# Patient Record
Sex: MALE | Race: BLACK OR AFRICAN AMERICAN | Employment: FULL TIME | URBAN - METROPOLITAN AREA
[De-identification: names, ages, dates, MRNs, and addresses within clinical notes are randomized per-mention and may not be internally consistent; named-entity substitution may affect disease eponyms.]

---

## 2017-11-08 PROBLEM — F32.1 MODERATE SINGLE CURRENT EPISODE OF MAJOR DEPRESSIVE DISORDER (HCC): Status: ACTIVE | Noted: 2017-11-08

## 2017-11-08 PROBLEM — F41.1 GENERALIZED ANXIETY DISORDER: Status: ACTIVE | Noted: 2017-11-08

## 2017-11-08 PROBLEM — J30.89 CHRONIC NON-SEASONAL ALLERGIC RHINITIS: Status: ACTIVE | Noted: 2017-11-08

## 2017-12-20 PROBLEM — I10 ESSENTIAL HYPERTENSION: Status: ACTIVE | Noted: 2017-12-20

## 2019-12-09 ENCOUNTER — HOSPITAL ENCOUNTER (OUTPATIENT)
Dept: ULTRASOUND IMAGING | Age: 49
Discharge: HOME | End: 2019-12-09
Attending: NURSE PRACTITIONER

## 2019-12-09 DIAGNOSIS — N50.89 SCROTAL SWELLING: ICD-10-CM

## 2019-12-09 DIAGNOSIS — R10.9 ACUTE LEFT FLANK PAIN: ICD-10-CM

## 2021-10-29 ENCOUNTER — HOSPITAL ENCOUNTER (OUTPATIENT)
Dept: CT IMAGING | Age: 51
Discharge: HOME | End: 2021-10-29
Attending: NURSE PRACTITIONER

## 2021-10-29 DIAGNOSIS — R10.32 LLQ PAIN: ICD-10-CM

## 2021-10-29 DIAGNOSIS — R10.9 LEFT FLANK PAIN: ICD-10-CM

## 2021-10-29 DIAGNOSIS — R10.12 LUQ PAIN: ICD-10-CM

## 2021-11-16 LAB — COLONOSCOPY, EXTERNAL: NORMAL

## 2022-03-19 PROBLEM — I10 ESSENTIAL HYPERTENSION: Status: ACTIVE | Noted: 2017-12-20

## 2022-03-19 PROBLEM — F32.1 MODERATE SINGLE CURRENT EPISODE OF MAJOR DEPRESSIVE DISORDER (HCC): Status: ACTIVE | Noted: 2017-11-08

## 2022-03-19 PROBLEM — J30.89 CHRONIC NON-SEASONAL ALLERGIC RHINITIS: Status: ACTIVE | Noted: 2017-11-08

## 2022-03-20 PROBLEM — F41.1 GENERALIZED ANXIETY DISORDER: Status: ACTIVE | Noted: 2017-11-08

## 2022-08-04 ENCOUNTER — TELEPHONE (OUTPATIENT)
Dept: FAMILY MEDICINE CLINIC | Facility: CLINIC | Age: 52
End: 2022-08-04

## 2022-08-04 NOTE — TELEPHONE ENCOUNTER
Pt was seen 03/23/2022 - 03/25/2022. His workplace lost his excuse and he needs a copy of it. I can't find it in the new system. Call pt and let him  know if he can get a copy or if a new one can be issued. He said he would like to get it tomorrow if possible.

## 2022-09-08 ENCOUNTER — OFFICE VISIT (OUTPATIENT)
Dept: FAMILY MEDICINE CLINIC | Facility: CLINIC | Age: 52
End: 2022-09-08
Payer: COMMERCIAL

## 2022-09-08 VITALS — TEMPERATURE: 97.5 F | WEIGHT: 218.8 LBS | OXYGEN SATURATION: 98 % | BODY MASS INDEX: 28.87 KG/M2 | HEART RATE: 55 BPM

## 2022-09-08 DIAGNOSIS — I10 ESSENTIAL (PRIMARY) HYPERTENSION: ICD-10-CM

## 2022-09-08 DIAGNOSIS — M25.512 ACUTE PAIN OF LEFT SHOULDER: Primary | ICD-10-CM

## 2022-09-08 DIAGNOSIS — R73.09 ELEVATED GLUCOSE: ICD-10-CM

## 2022-09-08 LAB
ALBUMIN, URINE, POC: 30 MG/L
BILIRUBIN, URINE, POC: NEGATIVE
BLOOD URINE, POC: ABNORMAL
CREATININE, URINE, POC: 300 MG/DL
GLUCOSE URINE, POC: NEGATIVE
KETONES, URINE, POC: NEGATIVE
LEUKOCYTE ESTERASE, URINE, POC: NEGATIVE
MICROALB/CREAT RATIO, POC: <30 MG/G
NITRITE, URINE, POC: ABNORMAL
PH, URINE, POC: 6.5 (ref 4.6–8)
PROTEIN,URINE, POC: NEGATIVE
SPECIFIC GRAVITY, URINE, POC: >1.03 (ref 1–1.03)
URINALYSIS CLARITY, POC: ABNORMAL
URINALYSIS COLOR, POC: YELLOW
UROBILINOGEN, POC: 0.2

## 2022-09-08 PROCEDURE — 82044 UR ALBUMIN SEMIQUANTITATIVE: CPT | Performed by: PHYSICIAN ASSISTANT

## 2022-09-08 PROCEDURE — 81003 URINALYSIS AUTO W/O SCOPE: CPT | Performed by: PHYSICIAN ASSISTANT

## 2022-09-08 PROCEDURE — 99214 OFFICE O/P EST MOD 30 MIN: CPT | Performed by: PHYSICIAN ASSISTANT

## 2022-09-08 RX ORDER — FLUOXETINE 10 MG/1
10 CAPSULE ORAL DAILY
Qty: 30 CAPSULE | Refills: 3 | Status: SHIPPED | OUTPATIENT
Start: 2022-09-08

## 2022-09-08 RX ORDER — AMLODIPINE BESYLATE 5 MG/1
5 TABLET ORAL DAILY
Qty: 90 TABLET | Refills: 1 | Status: SHIPPED | OUTPATIENT
Start: 2022-09-08

## 2022-09-08 ASSESSMENT — PATIENT HEALTH QUESTIONNAIRE - PHQ9
1. LITTLE INTEREST OR PLEASURE IN DOING THINGS: 0
2. FEELING DOWN, DEPRESSED OR HOPELESS: 0
SUM OF ALL RESPONSES TO PHQ QUESTIONS 1-9: 0
SUM OF ALL RESPONSES TO PHQ9 QUESTIONS 1 & 2: 0

## 2022-09-08 ASSESSMENT — ENCOUNTER SYMPTOMS
COUGH: 0
SHORTNESS OF BREATH: 0
BLOOD IN STOOL: 0

## 2022-09-08 NOTE — PROGRESS NOTES
[address] [place] [address]  Phone [phone]  Fax [phone]  Nona BATISTA    Patient: Mino Parmar  YOB: 1970  Patient Age 46 y.o. Patient sex: male  Medical Record:  [id number]  Visit Date:9/8/2022   Author:  Nicole Richards. Aramis Virk, Mike 57 Note     Chief complaint  Mino Parmar  is a 46 y.o. male who was seen on 09/08/22  10:14 AM  for the following reasons:    Chief Complaint   Patient presents with    Other     Knot on L shoulder, painful    Hypertension     Discuss BP meds    Anxiety     Discuss meds--anxiety interuppting daily life       Current Medical problems addressed    Foster Rosas is 45 yo male with hx of low back pain, htn, ED, depression , prediabetic. Last labs were from 10/25/21    He has been having a knot in his shoulders and been hurting for for several months . No known trauama. Hurts over the the distal clavicle     Hypertension  Stable and 120/80 today no chest pain    Anxiety - worsens as the day goes on - finds himself pacing and tried zoloft but it made him drowsiness   ASSESSMENT AND PLAN    ICD-10-CM    1. Acute pain of left shoulder  M25.512       2. Essential (primary) hypertension  I10 Hemoglobin A1C     Comprehensive Metabolic Panel     Lipid Panel     AMB POC URINALYSIS DIP STICK AUTO W/O MICRO     AMB POC URINE, MICROALBUMIN, SEMIQUANT (1 RESULT)      3. Elevated glucose  R73.09 Hemoglobin A1C     Comprehensive Metabolic Panel     Lipid Panel     AMB POC URINALYSIS DIP STICK AUTO W/O MICRO     AMB POC URINE, MICROALBUMIN, SEMIQUANT (1 RESULT)         Foster Rosas was seen today for other, hypertension and anxiety. Diagnoses and all orders for this visit:    Acute pain of left shoulder    Essential (primary) hypertension  -     Hemoglobin A1C; Future  -     Comprehensive Metabolic Panel; Future  -     Lipid Panel; Future  -     AMB POC URINALYSIS DIP STICK AUTO W/O MICRO;  Future  -     AMB POC URINE, MICROALBUMIN, SEMIQUANT (1 RESULT); Future    Elevated glucose  -     Hemoglobin A1C; Future  -     Comprehensive Metabolic Panel; Future  -     Lipid Panel; Future  -     AMB POC URINALYSIS DIP STICK AUTO W/O MICRO; Future  -     AMB POC URINE, MICROALBUMIN, SEMIQUANT (1 RESULT); Future    Other orders  -     diclofenac sodium (VOLTAREN) 1 % GEL; Apply 2 g topically 4 times daily    Plan includes the following:  Sent for xray and follow up with fasting labs in near future   No follow-up provider specified. Orders Placed This Encounter   Procedures    Hemoglobin A1C     Standing Status:   Future     Standing Expiration Date:   9/8/2023    Comprehensive Metabolic Panel     Standing Status:   Future     Standing Expiration Date:   9/8/2023    Lipid Panel     Standing Status:   Future     Standing Expiration Date:   9/8/2023    AMB POC URINALYSIS DIP STICK AUTO W/O MICRO     Standing Status:   Future     Standing Expiration Date:   9/8/2023    AMB POC URINE, MICROALBUMIN, SEMIQUANT (1 RESULT)     Standing Status:   Future     Standing Expiration Date:   9/8/2023       Past Medical History: Allergies:No Known Allergies    Current Medications:   Medications marked \"taking\" at this time:  Current Outpatient Medications   Medication Sig Dispense Refill    diclofenac sodium (VOLTAREN) 1 % GEL Apply 2 g topically 4 times daily 100 g 1    amLODIPine (NORVASC) 5 MG tablet Take 5 mg by mouth daily      aspirin 81 MG EC tablet Take 81 mg by mouth      fluticasone (FLONASE) 50 MCG/ACT nasal spray 2 sprays by Nasal route daily      tadalafil (CIALIS) 20 MG tablet Take 20 mg by mouth as needed       No current facility-administered medications for this visit.         Current Problem List:   Patient Active Problem List   Diagnosis    Moderate single current episode of major depressive disorder (HCC)    Chronic non-seasonal allergic rhinitis    Essential hypertension    Generalized anxiety disorder       Social History:   Social History     Tobacco Use    Smoking status: Never    Smokeless tobacco: Never   Substance Use Topics    Alcohol use: Yes     Alcohol/week: 6.0 standard drinks       Family History:   Family History   Problem Relation Age of Onset    Cancer Sister         pancreatic    Hypertension Mother     Cancer Father        Surgical History:History reviewed. No pertinent surgical history. ROS  Review of Systems   Constitutional:  Negative for fever. Eyes:  Negative for visual disturbance. Respiratory:  Negative for cough and shortness of breath. Cardiovascular:  Negative for chest pain. Gastrointestinal:  Negative for blood in stool. Musculoskeletal:  Positive for arthralgias (shoulder pain). Negative for myalgias. Skin:  Negative for rash. Neurological:  Negative for syncope and weakness. Psychiatric/Behavioral:  Negative for dysphoric mood. Pulse 55   Temp 97.5 °F (36.4 °C)   Wt 218 lb 12.8 oz (99.2 kg)   SpO2 98%   BMI 28.87 kg/m²   Body mass index is 28.87 kg/m². Physical Exam    Physical Exam  Vitals and nursing note reviewed. Constitutional:       Appearance: Normal appearance. Eyes:      Conjunctiva/sclera: Conjunctivae normal.   Cardiovascular:      Rate and Rhythm: Normal rate and regular rhythm. Pulmonary:      Effort: Pulmonary effort is normal.      Breath sounds: Normal breath sounds. Musculoskeletal:      Right shoulder: Normal.      Left shoulder: Tenderness and bony tenderness present. No crepitus. Normal range of motion. Right elbow: Normal. Normal range of motion. Left elbow: Normal. Normal range of motion. Arms:       Cervical back: Normal and neck supple. Right lower leg: No edema. Left lower leg: No edema. Neurological:      Mental Status: He is alert. Psychiatric:         Mood and Affect: Mood normal.        I have reviewed the patient's past medical history, social history and family history and vitals.   We have discussed treatment plan and follow up and given patient instructions. Patient's questions are answered and we will follow up as indicated. No follow-ups on file. Aletha Magana PA-C  10:14 AM  9/8/2022

## 2022-11-28 ENCOUNTER — OFFICE VISIT (OUTPATIENT)
Dept: FAMILY MEDICINE CLINIC | Facility: CLINIC | Age: 52
End: 2022-11-28
Payer: COMMERCIAL

## 2022-11-28 ENCOUNTER — TELEPHONE (OUTPATIENT)
Dept: FAMILY MEDICINE CLINIC | Facility: CLINIC | Age: 52
End: 2022-11-28

## 2022-11-28 VITALS
HEIGHT: 73 IN | BODY MASS INDEX: 29.4 KG/M2 | SYSTOLIC BLOOD PRESSURE: 138 MMHG | TEMPERATURE: 98.2 F | WEIGHT: 221.8 LBS | OXYGEN SATURATION: 97 % | HEART RATE: 58 BPM | DIASTOLIC BLOOD PRESSURE: 82 MMHG

## 2022-11-28 DIAGNOSIS — F14.11 COCAINE ABUSE IN REMISSION (HCC): Primary | ICD-10-CM

## 2022-11-28 LAB
11-NOR-9-THC-9-COOH, POC: NEGATIVE
AMPHETAMINE, URINE, POC: NEGATIVE
BENZODIAZEPINES, URINE, POC: NORMAL
BENZOYLECGONINE, URINE, POC: NEGATIVE
METHADONE, URINE, POC: NORMAL
METHAMPHETAMINE, URINE, POC: NEGATIVE
MORPHINE, URINE, POC: NEGATIVE
PHENCYCLIDINE, URINE, POC: NEGATIVE
URINALYSIS COLOR, POC: YELLOW

## 2022-11-28 PROCEDURE — 99214 OFFICE O/P EST MOD 30 MIN: CPT | Performed by: PHYSICIAN ASSISTANT

## 2022-11-28 PROCEDURE — 3074F SYST BP LT 130 MM HG: CPT | Performed by: PHYSICIAN ASSISTANT

## 2022-11-28 PROCEDURE — 3078F DIAST BP <80 MM HG: CPT | Performed by: PHYSICIAN ASSISTANT

## 2022-11-28 PROCEDURE — 80305 DRUG TEST PRSMV DIR OPT OBS: CPT | Performed by: PHYSICIAN ASSISTANT

## 2022-11-28 RX ORDER — AMLODIPINE BESYLATE 5 MG/1
5 TABLET ORAL DAILY
Qty: 90 TABLET | Refills: 1 | Status: SHIPPED | OUTPATIENT
Start: 2022-11-28

## 2022-11-28 RX ORDER — FLUOXETINE HYDROCHLORIDE 20 MG/1
20 CAPSULE ORAL DAILY
Qty: 90 CAPSULE | Refills: 1 | Status: SHIPPED | OUTPATIENT
Start: 2022-11-28

## 2022-11-28 ASSESSMENT — PATIENT HEALTH QUESTIONNAIRE - PHQ9
SUM OF ALL RESPONSES TO PHQ QUESTIONS 1-9: 0
SUM OF ALL RESPONSES TO PHQ9 QUESTIONS 1 & 2: 0
1. LITTLE INTEREST OR PLEASURE IN DOING THINGS: 0
SUM OF ALL RESPONSES TO PHQ QUESTIONS 1-9: 0
2. FEELING DOWN, DEPRESSED OR HOPELESS: 0
SUM OF ALL RESPONSES TO PHQ QUESTIONS 1-9: 0
SUM OF ALL RESPONSES TO PHQ QUESTIONS 1-9: 0

## 2022-11-28 NOTE — LETTER
[facility]  [address] [facility] [address]  Phone: [phone]  Fax: [phone] [facility] [address] [place]        November 28, 2022     Patient: Polly Messer   YOB: 1970   Date of Visit: 11/28/2022       To Whom it May Concern:    Jamila Alan was seen in my clinic on 11/28/2022. He may return to work on 11/29/22. his urine drug screen is negative today during exam today. If you have any questions or concerns, please don't hesitate to call. Sincerely,         Monika Meraz.  Perla Fabian PA-C

## 2022-11-28 NOTE — TELEPHONE ENCOUNTER
Per pt request, I faxed Drug screen to [phone]. Nirav Paulino gave pt work note at visit in office.

## 2022-11-28 NOTE — PROGRESS NOTES
[address] [place] [address]  Phone [phone]  Fax [phone]  Marycruz BATISTA    Patient: Whitley Roth  YOB: 1970  Patient Age 45 y.o. Patient sex: male  Medical Record:  [id number]  Visit Date:11/28/2022   Author:  Neha Haro. [address] [place] [address] 57 Note     Chief complaint  Whitley Roth  is a 45 y.o. male who was seen on 11/30/22  12:26 AM  for the following reasons:    Chief Complaint   Patient presents with    Other     Forms for short term disability--wants drug testing--was using coke and Pot-\"a little bit of everything\"    Out of work since 11/10 due to Substance abuse    Anxiety     May need Prozac Increased       Current Medical problems addressed  Patient is new to me    Sindi Goncalves is 44 yo male who is struggling with drug use and has been working on recovery. He has been seeing a therapist and meets. He has been using cocaine for 15-20 years - he detoxed when incarcerated but this is his first detox being free. He notes last time he used was nov 10th  and used some meth and detoxed at home. He is working with [address] -- he has met with her twice and has another appt tomorrow. He is also involved in [employer] (faces and voices of recovery). He is doing 3-4 meeting a week. He is in waiting for a /mentor. He has hx of anxiety and depression and worried about anxiety. He has been out of work since 11/10/22. He works as a  and wants to go back to work. He notes his EAP said to ask how long to expect to get drugs out of the system and when to test to return to work. ASSESSMENT AND PLAN    ICD-10-CM    1. Cocaine abuse in remission ([facility] [address]  F14.11 AMB POC DRUG SCREEN, URINE         Sindi Goncalves was seen today for other and anxiety.     Diagnoses and all orders for this visit:    Cocaine abuse in remission ([facility] [address]  -     AMB POC DRUG SCREEN, URINE    Other orders  -     FLUoxetine (PROZAC) 20 MG capsule; Take 1 capsule by mouth daily  -     amLODIPine (NORVASC) 5 MG tablet; Take 1 tablet by mouth daily    Plan includes the following:  Recommended following up with therapist and continue attending meetings regularly and getting connected with a /mento  No follow-up provider specified. Orders Placed This Encounter   Procedures    AMB POC DRUG SCREEN, URINE       Past Medical History: Allergies:No Known Allergies    Current Medications:   Medications marked \"taking\" at this time:  Current Outpatient Medications   Medication Sig Dispense Refill    FLUoxetine (PROZAC) 20 MG capsule Take 1 capsule by mouth daily 90 capsule 1    amLODIPine (NORVASC) 5 MG tablet Take 1 tablet by mouth daily 90 tablet 1    diclofenac sodium (VOLTAREN) 1 % GEL Apply 2 g topically 4 times daily 100 g 1    aspirin 81 MG EC tablet Take 81 mg by mouth      fluticasone (FLONASE) 50 MCG/ACT nasal spray 2 sprays by Nasal route daily      tadalafil (CIALIS) 20 MG tablet Take 20 mg by mouth as needed       No current facility-administered medications for this visit. Current Problem List:   Patient Active Problem List   Diagnosis    Moderate single current episode of major depressive disorder (HCC)    Chronic non-seasonal allergic rhinitis    Essential hypertension    Generalized anxiety disorder       Social History:   Social History     Tobacco Use    Smoking status: Never    Smokeless tobacco: Never   Substance Use Topics    Alcohol use: Yes     Alcohol/week: 6.0 standard drinks       Family History:   Family History   Problem Relation Age of Onset    Cancer Sister         pancreatic    Hypertension Mother     Cancer Father        Surgical History:No past surgical history on file. ROS  Review of Systems   Constitutional:  Negative for fever. Eyes:  Negative for visual disturbance. Respiratory:  Negative for cough and shortness of breath. Cardiovascular:  Negative for chest pain.    Gastrointestinal:  Negative for blood in stool. Musculoskeletal:  Negative for myalgias. Skin:  Negative for rash. Neurological:  Negative for syncope and weakness. Psychiatric/Behavioral:  Negative for dysphoric mood. /82   Pulse 58   Temp 98.2 °F (36.8 °C)   Ht 6' 1\" (1.854 m)   Wt 221 lb 12.8 oz (100.6 kg)   SpO2 97%   BMI 29.26 kg/m²   Body mass index is 29.26 kg/m². Physical Exam    Physical Exam  Vitals and nursing note reviewed. Cardiovascular:      Rate and Rhythm: Normal rate and regular rhythm. Pulmonary:      Effort: Pulmonary effort is normal.   Skin:     Coloration: Skin is not pale. Neurological:      Mental Status: He is oriented to person, place, and time. Psychiatric:         Mood and Affect: Mood normal.         Behavior: Behavior normal.         Thought Content: Thought content normal.        I have reviewed the patient's past medical history, social history and family history and vitals. We have discussed treatment plan and follow up and given patient instructions. Patient's questions are answered and we will follow up as indicated. No follow-ups on file. Aletha Still PA-C  12:26 AM  11/30/2022

## 2022-11-30 ASSESSMENT — ENCOUNTER SYMPTOMS
COUGH: 0
SHORTNESS OF BREATH: 0
BLOOD IN STOOL: 0

## 2022-12-06 ENCOUNTER — TELEPHONE (OUTPATIENT)
Dept: FAMILY MEDICINE CLINIC | Facility: CLINIC | Age: 52
End: 2022-12-06

## 2022-12-13 ENCOUNTER — TELEPHONE (OUTPATIENT)
Dept: FAMILY MEDICINE CLINIC | Facility: CLINIC | Age: 52
End: 2022-12-13

## 2022-12-13 NOTE — TELEPHONE ENCOUNTER
Pt wants you to call him with a update on his short term disability forms from [facility].

## 2022-12-15 NOTE — TELEPHONE ENCOUNTER
I have called twice regarding forms being faxed over. I called again this AM, the rep said she was faxing them then. As of 12:32, forms still not received.

## 2023-03-02 ENCOUNTER — TELEPHONE (OUTPATIENT)
Dept: FAMILY MEDICINE CLINIC | Facility: CLINIC | Age: 53
End: 2023-03-02

## 2023-03-08 RX ORDER — TADALAFIL 20 MG/1
TABLET ORAL
Qty: 20 TABLET | Refills: 0 | Status: SHIPPED | OUTPATIENT
Start: 2023-03-08

## 2023-03-14 ENCOUNTER — OFFICE VISIT (OUTPATIENT)
Dept: FAMILY MEDICINE CLINIC | Facility: CLINIC | Age: 53
End: 2023-03-14
Payer: COMMERCIAL

## 2023-03-14 VITALS
HEART RATE: 51 BPM | DIASTOLIC BLOOD PRESSURE: 88 MMHG | WEIGHT: 231.6 LBS | OXYGEN SATURATION: 96 % | HEIGHT: 73 IN | BODY MASS INDEX: 30.69 KG/M2 | SYSTOLIC BLOOD PRESSURE: 126 MMHG | TEMPERATURE: 98.3 F

## 2023-03-14 DIAGNOSIS — Z11.4 ENCOUNTER FOR SCREENING FOR HIV: ICD-10-CM

## 2023-03-14 DIAGNOSIS — R73.09 ELEVATED GLUCOSE: ICD-10-CM

## 2023-03-14 DIAGNOSIS — N52.9 ERECTILE DYSFUNCTION, UNSPECIFIED ERECTILE DYSFUNCTION TYPE: Primary | ICD-10-CM

## 2023-03-14 DIAGNOSIS — F32.1 MAJOR DEPRESSIVE DISORDER, SINGLE EPISODE, MODERATE (HCC): ICD-10-CM

## 2023-03-14 DIAGNOSIS — I10 ESSENTIAL (PRIMARY) HYPERTENSION: ICD-10-CM

## 2023-03-14 DIAGNOSIS — F14.11 COCAINE ABUSE IN REMISSION (HCC): ICD-10-CM

## 2023-03-14 LAB
11-NOR-9-THC-9-COOH, POC: NEGATIVE
AMPHETAMINE, URINE, POC: NEGATIVE
BENZODIAZEPINES, URINE, POC: NORMAL
BENZOYLECGONINE, URINE, POC: NEGATIVE
EST. AVERAGE GLUCOSE BLD GHB EST-MCNC: 108 MG/DL
HBA1C MFR BLD: 5.4 % (ref 4.8–5.6)
METHADONE, URINE, POC: NORMAL
METHAMPHETAMINE, URINE, POC: NEGATIVE
MORPHINE, URINE, POC: NEGATIVE
PHENCYCLIDINE, URINE, POC: NEGATIVE
URINALYSIS COLOR, POC: YELLOW

## 2023-03-14 PROCEDURE — 99214 OFFICE O/P EST MOD 30 MIN: CPT | Performed by: PHYSICIAN ASSISTANT

## 2023-03-14 PROCEDURE — 80305 DRUG TEST PRSMV DIR OPT OBS: CPT | Performed by: PHYSICIAN ASSISTANT

## 2023-03-14 PROCEDURE — 3079F DIAST BP 80-89 MM HG: CPT | Performed by: PHYSICIAN ASSISTANT

## 2023-03-14 PROCEDURE — 3074F SYST BP LT 130 MM HG: CPT | Performed by: PHYSICIAN ASSISTANT

## 2023-03-14 RX ORDER — FLUOXETINE HYDROCHLORIDE 40 MG/1
40 CAPSULE ORAL DAILY
Qty: 90 CAPSULE | Refills: 1 | Status: SHIPPED | OUTPATIENT
Start: 2023-03-14

## 2023-03-14 RX ORDER — TADALAFIL 20 MG/1
TABLET ORAL
Qty: 20 TABLET | Refills: 5 | Status: SHIPPED | OUTPATIENT
Start: 2023-03-14

## 2023-03-14 RX ORDER — AMLODIPINE BESYLATE 5 MG/1
5 TABLET ORAL DAILY
Qty: 90 TABLET | Refills: 1 | Status: SHIPPED | OUTPATIENT
Start: 2023-03-14

## 2023-03-14 SDOH — ECONOMIC STABILITY: HOUSING INSECURITY
IN THE LAST 12 MONTHS, WAS THERE A TIME WHEN YOU DID NOT HAVE A STEADY PLACE TO SLEEP OR SLEPT IN A SHELTER (INCLUDING NOW)?: YES

## 2023-03-14 SDOH — ECONOMIC STABILITY: FOOD INSECURITY: WITHIN THE PAST 12 MONTHS, THE FOOD YOU BOUGHT JUST DIDN'T LAST AND YOU DIDN'T HAVE MONEY TO GET MORE.: SOMETIMES TRUE

## 2023-03-14 SDOH — ECONOMIC STABILITY: FOOD INSECURITY: WITHIN THE PAST 12 MONTHS, YOU WORRIED THAT YOUR FOOD WOULD RUN OUT BEFORE YOU GOT MONEY TO BUY MORE.: SOMETIMES TRUE

## 2023-03-14 SDOH — ECONOMIC STABILITY: INCOME INSECURITY: HOW HARD IS IT FOR YOU TO PAY FOR THE VERY BASICS LIKE FOOD, HOUSING, MEDICAL CARE, AND HEATING?: HARD

## 2023-03-14 ASSESSMENT — PATIENT HEALTH QUESTIONNAIRE - PHQ9
4. FEELING TIRED OR HAVING LITTLE ENERGY: 3
6. FEELING BAD ABOUT YOURSELF - OR THAT YOU ARE A FAILURE OR HAVE LET YOURSELF OR YOUR FAMILY DOWN: 0
SUM OF ALL RESPONSES TO PHQ9 QUESTIONS 1 & 2: 4
3. TROUBLE FALLING OR STAYING ASLEEP: 1
SUM OF ALL RESPONSES TO PHQ QUESTIONS 1-9: 14
2. FEELING DOWN, DEPRESSED OR HOPELESS: 3
8. MOVING OR SPEAKING SO SLOWLY THAT OTHER PEOPLE COULD HAVE NOTICED. OR THE OPPOSITE, BEING SO FIGETY OR RESTLESS THAT YOU HAVE BEEN MOVING AROUND A LOT MORE THAN USUAL: 3
7. TROUBLE CONCENTRATING ON THINGS, SUCH AS READING THE NEWSPAPER OR WATCHING TELEVISION: 3
10. IF YOU CHECKED OFF ANY PROBLEMS, HOW DIFFICULT HAVE THESE PROBLEMS MADE IT FOR YOU TO DO YOUR WORK, TAKE CARE OF THINGS AT HOME, OR GET ALONG WITH OTHER PEOPLE: 1
1. LITTLE INTEREST OR PLEASURE IN DOING THINGS: 1
SUM OF ALL RESPONSES TO PHQ QUESTIONS 1-9: 14
9. THOUGHTS THAT YOU WOULD BE BETTER OFF DEAD, OR OF HURTING YOURSELF: 0
5. POOR APPETITE OR OVEREATING: 0
SUM OF ALL RESPONSES TO PHQ QUESTIONS 1-9: 14
SUM OF ALL RESPONSES TO PHQ QUESTIONS 1-9: 14

## 2023-03-14 ASSESSMENT — ENCOUNTER SYMPTOMS
COUGH: 0
BLOOD IN STOOL: 0
SHORTNESS OF BREATH: 0

## 2023-03-14 NOTE — PATIENT INSTRUCTIONS
[address] [facility]  What they offer: Housing Choice Vouchers (Section 8) rental assistance available to persons with disabilities, families with children, and older adults whose household income is below 80% the median income for [facility]. http://www.ComCrowd/. net  Phone: [phone]    [address] they offer: Free 24/7 access to trained counselors for local resources and assistance   o Website: CX.Abeelo. asp   o Phone Number: [phone] (text messaging accepted)       Education Development Center (EDC). Stockezy:https://www. Clavis Technology/rmzmocwfvb-cxahve-nh/    [employer] Communications.co.za    [facility]. Kingmaker/pdfs/resources-[place]. pdf    [place] Financial Guide: TripleFare.com.cy. php     [facility] List: http://Children's MinnesotaConnectAndSellections. org/need-help      Need additional resources? Call 211 or visit Find Help:  https://www. findhelp.org/TRANSPORTATION RESOURCES    Medicaid Van: Alvarez PETER NAME - [employer]   Phone:  [phone]  Must call for a ride at least 3 days before your appointment. Call Monday- Friday 8:00am to 5:00pm.    Transportation is available for MD appts, dialysis, x-rays, lab work, drug store, or other medical appointments. Ambrocio Medel on Aging  What they offer: Provides assistance and medical transport to adults 60 years and older.   Phone: [phone]    GreenButton   What they offer: Charge a fee for transport (not free)  Phone: [phone]    Transportation on Demand   What they offer: Charge a fee for transport (not free)  Phone: 21 [phone] they offer: Dee Munson is accessible via an online platform that connects patients with non-emergency medical transportation (NEMT.) Parvez is a comprehensive solution which supports all levels of transport: rideshare (Lyft/Uber), Taxis, wheelchair vans, stretcher vehicles, ALS/BLS, and all payors when and where they are needed. https://CrimeWatch US/      Need additional resources? Call 211 or visit Find Help:  https://www. findhelp.org/FINANCIAL RESOURCES    Mylene   o What they offer: The [facility] Program helps uninsured patients who do not qualify for government-sponsored health insurance and cannot afford to pay for their medical care. Insured patients may also qualify for assistance based on family income, family size, and medical needs. o Phone Number: [phone]      o How to apply for the [place] Program:   Option 1: To apply for financial assistance, a patient (or their family or other provider) should fill out the Financial Assistance Application. Copies of the Financial Assistance Application and the FAP may be obtained for free by calling the [facility] customer service department at [phone]. Option 2: The Financial Assistance Application and policy may be obtained for free by downloading a copy from the Genius Pack: [employer]. ParLevel Systems/patient-resources/financial-assistance       o Applications are available in several languages on the website     nuevoStage  What they offer:  May be able to assist with medical bills if you are uninsured. Phone number: [phone]  www. [employer]    [employer]x and Resources for the Cardona's (uninsured and under insured) A Nurse and  are available. Argentine speaking staff available   What they offer: Provide information and assistance for the whole family  Discuss your health and help you find a doctor if you need one. Answer questions about your medications. Diabetes Self-Management education.     Connect you with financial assistance agencies, social and medical services. Provide moral support during difficult times. Unfortunately they are unable to administer any medicine, provide you with money or transportation in our vehicles. However, the team will try to help you with your health needs. Phone: [phone] to make an appointment. Leave voice mail with name and call back number. Medication Cost Assistance    Good Rx    o What they offer: Good Rx tracks prescription drug prices and provides drug  coupons for discounts on medications. o Website: Loco Partners/     NeedyMeds   o What they offer: NeedyMeds offers free information on medications and healthcare cost savings programs including prescription assistance programs, coupons, and discount programs. o Website: PaymentTicket Cake org/   o Helpline: 313.232.9107     RX Assist   o What they offer: Information about free and low-cost medicine programs. o Website: https://Camileon Heels/     [facility] $4 Prescription Program   o What they offer: Prescription Program includes up to a 30-day supply for $4 and a 90-day supply for $10 of some covered generic drugs at commonly prescribed dosages   o Website: Floyd Vickers  What they offer:  If you are uninsured and cannot afford the prescription medicine you need, you may be able to have your prescriptions filled at no cost through [employer]. You must live in [place]. To find out if you qualify. Website: [employer]jing Gelan Environmental Protection Equipment.it    Cost Plus Drugs  What they offer:  Low-cost versions of high-cost generic drugs  Website: https://costiCar Asia. SWITCH Materials/    [facility] of   https://[facility].gov/  Phone: [phone]      Need additional resources? Call 211 or Find Help: https://www. Drip Inp.org/FOOD RESOURCES    Meals on Wheels:  What they offer: Meals on Wheels is a program that delivers meals to individuals who have no reliable means for maintaining a healthy diet.   Malachi Phone: [phone]  www.Calypso [facility]les[facility].Lodo Software    Food Share [place] RecycleMatch:  What they offer:  Anyone is eligible to order, but Tori is specifically designed for customers who could benefit from accessible, low-cost fresh food.  Fresh Food Boxes are $15* with credit/debit card and are ALWAYS $5 with SNAP/EBT   Boxes are distributed every other week and you must preorder your box through their website  Drive-thru box pickup is every other Wednesday from 11 am-6 pm at: THE [facility] ([address] [facility]) [address], [place] [address]  Website:  www.[facility].org/fsg     Food Pantries:   [facility]   Phone: [phone]  Located in [facility], [address]  Open Thursdays 8a-12p  Website: www.StandDesk.Walls Holding  Phone: [phone]  Located at [address]  Open 8am-5pm Mon-Thur., 8am-12pm Fridays  Website: https://PharmlySentara Martha Jefferson HospitalMachinima.org/   [employer] [facility]ry  Phone: [phone]  Located at [address]  Call for Pantry hours and availability  Website: https://www.charitiessc.org/wellness-services  [facility] Food Pantry  Phone: [phone]  Located at [phone]  Call for Pantry hours and availability    Website: https://www.WeAreHolidays.org/index.php  [facility] Food [facility]  Phone: [phone]  Located at [address]  Emergency Food Pantry Hours: Mon, Wed, and Fri 9am-1pm  Website: https://www.Singlye.org/  [employer] Food Pantry  Phone: [phone]  Located at [address]  Call for hours and availability   Website: https://Wikets/  [facility] Food Pantry  Phone: [phone]  Located at [address]  Call for hours and availability; serves up to 75 families a week, based on donations  Website: https://VanGogh Imaging-Quantec Geosciencel.cc/      Need additional resources? Call 211 or  Find Help: https://www. findhelp.org/

## 2023-03-14 NOTE — PROGRESS NOTES
[address] [place] [address]  Phone [phone]  Fax [phone]  Felipe  PA    Patient: Thiago Ngo  YOB: 1970  Patient Age 45 y.o. Patient sex: male  Medical Record:  [id number]  Visit Date:3/14/2023   Author:  Angelica Toussaint. Mike Mcduffie 57 Note     Chief complaint  Thiago Ngo  is a 45 y.o. male who was seen on 03/14/23  4:41 PM  for the following reasons:    Chief Complaint   Patient presents with    Medication Refill     ALL---HAS NO DICLOFENAC AND flonase    Other     Discuss claim being denied by the [facility]. ...wants to appeal        Labs--HIV testing    Depression     Discuss meds---Mother passed 2 weeks ago       Current Medical problems addressed    He has used cialis about once a week     He used cocaine in the past and notes he has been clean for a month without relapse and still seeing his counselour and participating in [place] and doing zoom meetings 1-2 x a week     He finds the prozac and it helps but he notes its not helping enough. Mother passed away 2 weeks ago. And processing that. So been feeling down and fiance recommended he come in and talk about meds. He is currently on prozac 20mg   ASSESSMENT AND PLAN    ICD-10-CM    1. Erectile dysfunction, unspecified erectile dysfunction type  N52.9 tadalafil (CIALIS) 20 MG tablet      2. Encounter for screening for HIV  Z11.4 HIV 1/2 Ag/Ab, 4TH Generation,W Rflx Confirm     HIV 1/2 Ag/Ab, 4TH Generation,W Rflx Confirm      3. Major depressive disorder, single episode, moderate (HCC)  F32.1 FLUoxetine (PROZAC) 40 MG capsule      4. Essential (primary) hypertension  I10 amLODIPine (NORVASC) 5 MG tablet     Hemoglobin A1C     Comprehensive Metabolic Panel     Lipid Panel     Lipid Panel     Comprehensive Metabolic Panel     Hemoglobin A1C      5.  Cocaine abuse in remission (HCC)  F14.11 AMB POC DRUG SCREEN, URINE     CANCELED: Urine Drug Screen     CANCELED: Urine Drug Screen      6. Elevated glucose  R73.09 Hemoglobin A1C     Hemoglobin A1C         Madai Tate was seen today for medication refill, other and depression. Diagnoses and all orders for this visit:    Erectile dysfunction, unspecified erectile dysfunction type  -     tadalafil (CIALIS) 20 MG tablet; TAKE 1 TABLET BY MOUTH ONCE DAILY AS NEEDED FOR ERECTILE DYSFUNCTION    Encounter for screening for HIV  -     HIV 1/2 Ag/Ab, 4TH Generation,W Rflx Confirm; Future  -     HIV 1/2 Ag/Ab, 4TH Generation,W Rflx Confirm    Major depressive disorder, single episode, moderate (HCC)  -     FLUoxetine (PROZAC) 40 MG capsule; Take 1 capsule by mouth daily    Essential (primary) hypertension  -     amLODIPine (NORVASC) 5 MG tablet; Take 1 tablet by mouth daily  -     Hemoglobin A1C; Future  -     Comprehensive Metabolic Panel; Future  -     Lipid Panel; Future  -     Lipid Panel  -     Comprehensive Metabolic Panel  -     Hemoglobin A1C    Cocaine abuse in remission (HCC)  -     Cancel: Urine Drug Screen; Future  -     Cancel: Urine Drug Screen  -     AMB POC DRUG SCREEN, URINE    Elevated glucose  -     Hemoglobin A1C; Future  -     Hemoglobin A1C    Plan includes the following:  Increased prozac from 20 to 40mg dose  Did uds to help insurance have more data on his jurney with sobriety  Refilled cialiss  Htn -- Plan: Well-controlled, continue current medications, medication adherence emphasized and lifestyle modifications recommended       No follow-up provider specified.   Orders Placed This Encounter   Procedures    HIV 1/2 Ag/Ab, 4TH Generation,W Rflx Confirm     Standing Status:   Future     Number of Occurrences:   1     Standing Expiration Date:   3/14/2024    Hemoglobin A1C     Standing Status:   Future     Number of Occurrences:   1     Standing Expiration Date:   3/14/2024    Comprehensive Metabolic Panel     Standing Status:   Future     Number of Occurrences:   1     Standing Expiration Date:   3/14/2024    Lipid Panel Standing Status:   Future     Number of Occurrences:   1     Standing Expiration Date:   3/14/2024    AMB POC DRUG SCREEN, URINE       Past Medical History: Allergies:No Known Allergies    Current Medications:   Medications marked \"taking\" at this time:  Current Outpatient Medications   Medication Sig Dispense Refill    tadalafil (CIALIS) 20 MG tablet TAKE 1 TABLET BY MOUTH ONCE DAILY AS NEEDED FOR ERECTILE DYSFUNCTION 20 tablet 5    amLODIPine (NORVASC) 5 MG tablet Take 1 tablet by mouth daily 90 tablet 1    FLUoxetine (PROZAC) 40 MG capsule Take 1 capsule by mouth daily 90 capsule 1    aspirin 81 MG EC tablet Take 81 mg by mouth       No current facility-administered medications for this visit. Current Problem List:   Patient Active Problem List   Diagnosis    Moderate single current episode of major depressive disorder (HCC)    Chronic non-seasonal allergic rhinitis    Essential hypertension    Generalized anxiety disorder       Social History:   Social History     Tobacco Use    Smoking status: Never    Smokeless tobacco: Never   Substance Use Topics    Alcohol use: Yes     Alcohol/week: 6.0 standard drinks       Family History:   Family History   Problem Relation Age of Onset    Cancer Sister         pancreatic    Hypertension Mother     Cancer Father        Surgical History:History reviewed. No pertinent surgical history. ROS  Review of Systems   Constitutional:  Negative for fever. Eyes:  Negative for visual disturbance. Respiratory:  Negative for cough and shortness of breath. Cardiovascular:  Negative for chest pain. Gastrointestinal:  Negative for blood in stool. Musculoskeletal:  Negative for myalgias. Skin:  Negative for rash. Neurological:  Negative for syncope and weakness. Psychiatric/Behavioral:  Negative for dysphoric mood.       /88   Pulse 51   Temp 98.3 °F (36.8 °C)   Ht 6' 1\" (1.854 m)   Wt 231 lb 9.6 oz (105.1 kg)   SpO2 96%   BMI 30.56 kg/m²   Body mass index is 30.56 kg/m². Physical Exam    Physical Exam  Vitals and nursing note reviewed. Constitutional:       Appearance: Normal appearance. Eyes:      Conjunctiva/sclera: Conjunctivae normal.   Cardiovascular:      Rate and Rhythm: Normal rate and regular rhythm. Pulmonary:      Effort: Pulmonary effort is normal.      Breath sounds: Normal breath sounds. Musculoskeletal:      Cervical back: Neck supple. Right lower leg: No edema. Left lower leg: No edema. Neurological:      Mental Status: He is alert. Psychiatric:         Mood and Affect: Mood normal.        I have reviewed the patient's past medical history, social history and family history and vitals. We have discussed treatment plan and follow up and given patient instructions. Patient's questions are answered and we will follow up as indicated. No follow-ups on file. Aletha Navarrete PA-C  4:41 PM  3/14/2023

## 2023-03-15 LAB
ALBUMIN SERPL-MCNC: 3.8 G/DL (ref 3.5–5)
ALBUMIN/GLOB SERPL: 1.1 (ref 0.4–1.6)
ALP SERPL-CCNC: 75 U/L (ref 50–136)
ALT SERPL-CCNC: 25 U/L (ref 12–65)
ANION GAP SERPL CALC-SCNC: 5 MMOL/L (ref 2–11)
AST SERPL-CCNC: 17 U/L (ref 15–37)
BILIRUB SERPL-MCNC: 0.6 MG/DL (ref 0.2–1.1)
BUN SERPL-MCNC: 14 MG/DL (ref 6–23)
CALCIUM SERPL-MCNC: 9 MG/DL (ref 8.3–10.4)
CHLORIDE SERPL-SCNC: 110 MMOL/L (ref 101–110)
CHOLEST SERPL-MCNC: 197 MG/DL
CO2 SERPL-SCNC: 24 MMOL/L (ref 21–32)
CREAT SERPL-MCNC: 1.2 MG/DL (ref 0.8–1.5)
GLOBULIN SER CALC-MCNC: 3.4 G/DL (ref 2.8–4.5)
GLUCOSE SERPL-MCNC: 93 MG/DL (ref 65–100)
HDLC SERPL-MCNC: 61 MG/DL (ref 40–60)
HDLC SERPL: 3.2
HIV 1+2 AB+HIV1 P24 AG SERPL QL IA: NONREACTIVE
HIV 1/2 RESULT COMMENT: NORMAL
LDLC SERPL CALC-MCNC: 119.6 MG/DL
POTASSIUM SERPL-SCNC: 4.2 MMOL/L (ref 3.5–5.1)
PROT SERPL-MCNC: 7.2 G/DL (ref 6.3–8.2)
SODIUM SERPL-SCNC: 139 MMOL/L (ref 133–143)
TRIGL SERPL-MCNC: 82 MG/DL (ref 35–150)
VLDLC SERPL CALC-MCNC: 16.4 MG/DL (ref 6–23)